# Patient Record
Sex: MALE | Race: WHITE | NOT HISPANIC OR LATINO | Employment: OTHER | URBAN - NONMETROPOLITAN AREA
[De-identification: names, ages, dates, MRNs, and addresses within clinical notes are randomized per-mention and may not be internally consistent; named-entity substitution may affect disease eponyms.]

---

## 2017-06-10 ENCOUNTER — RESULTS ENCOUNTER (OUTPATIENT)
Dept: UROLOGY | Facility: CLINIC | Age: 72
End: 2017-06-10

## 2017-06-10 DIAGNOSIS — R97.20 ELEVATED PROSTATE SPECIFIC ANTIGEN (PSA): ICD-10-CM

## 2017-06-22 ENCOUNTER — OFFICE VISIT (OUTPATIENT)
Dept: UROLOGY | Facility: CLINIC | Age: 72
End: 2017-06-22

## 2017-06-22 VITALS
WEIGHT: 199 LBS | BODY MASS INDEX: 26.95 KG/M2 | DIASTOLIC BLOOD PRESSURE: 94 MMHG | HEIGHT: 72 IN | SYSTOLIC BLOOD PRESSURE: 142 MMHG | TEMPERATURE: 97.3 F

## 2017-06-22 DIAGNOSIS — N13.8 BPH (BENIGN PROSTATIC HYPERTROPHY) WITH URINARY OBSTRUCTION: ICD-10-CM

## 2017-06-22 DIAGNOSIS — N40.1 BPH (BENIGN PROSTATIC HYPERTROPHY) WITH URINARY OBSTRUCTION: ICD-10-CM

## 2017-06-22 DIAGNOSIS — R97.20 ELEVATED PROSTATE SPECIFIC ANTIGEN (PSA): Primary | ICD-10-CM

## 2017-06-22 LAB
BILIRUB BLD-MCNC: NEGATIVE MG/DL
CLARITY, POC: CLEAR
COLOR UR: YELLOW
GLUCOSE UR STRIP-MCNC: NEGATIVE MG/DL
KETONES UR QL: NEGATIVE
LEUKOCYTE EST, POC: NEGATIVE
NITRITE UR-MCNC: NEGATIVE MG/ML
PH UR: 6 [PH] (ref 5–8)
PROT UR STRIP-MCNC: NEGATIVE MG/DL
RBC # UR STRIP: NEGATIVE /UL
SP GR UR: 1.02 (ref 1–1.03)
UROBILINOGEN UR QL: NORMAL

## 2017-06-22 PROCEDURE — 99213 OFFICE O/P EST LOW 20 MIN: CPT | Performed by: UROLOGY

## 2017-06-22 PROCEDURE — 81003 URINALYSIS AUTO W/O SCOPE: CPT | Performed by: UROLOGY

## 2017-06-22 NOTE — PROGRESS NOTES
Subjective    Mr. Dutta is 71 y.o. male    Chief Complaint: Elevated PSA/BPH    History of Present Illness   Elevated PSA  Patient is here with an elevated PSA. The PSA was drawn1 week(s). He does not have a family history of prostate cancer. His AUA Symptom Score is 9 /35. Voiding symptoms include Incomplete emptying, Intermittency, Urgency, Weakened stream, Straining and Nocturia. Denies Frequency, Dysuria and Hematuria. Voiding symptoms began several years . These have been gradual in onset. none  Previous PSA values are : 4.25 (8.5)  No results found for: PSA      Benign Prostatic Hypertrophy  Patient complains of lower urinary tract symptoms. He reports incomplete emptying, nocturia one time a night and straining. He denies frequency. Patient states symptoms are of mild severity. Onset of symptoms was several years ago and was gradual in onset. His AUA Symptom Score is, 9/35.He reports a history of no complicating symptoms. He denies flank pain, gross hematuria, kidney stones and recurrent UTI. Patient has tried Watchful waiting with improvement. Last PSA was 4.25 .      The following portions of the patient's history were reviewed and updated as appropriate: allergies, current medications, past family history, past medical history, past social history, past surgical history and problem list.    Review of Systems   Constitutional: Negative for chills and fever.   Gastrointestinal: Negative for abdominal pain, anal bleeding and blood in stool.   Genitourinary: Negative for flank pain and hematuria.         Current Outpatient Prescriptions:   •  folic acid (FOLVITE) 1 MG tablet, Take 1 mg by mouth Daily., Disp: , Rfl:   •  losartan-hydrochlorothiazide (HYZAAR) 100-25 MG per tablet, Take 1 tablet by mouth Daily., Disp: , Rfl:   •  methylphenidate (RITALIN) 10 MG tablet, Take 10 mg by mouth 2 (Two) Times a Day., Disp: , Rfl:   •  nebivolol (BYSTOLIC) 10 MG tablet, Take 10 mg by mouth Daily., Disp: , Rfl:   •   "simvastatin (ZOCOR) 40 MG tablet, Take 40 mg by mouth Every Night., Disp: , Rfl:   •  sucralfate (CARAFATE) 1 G tablet, Take 1 g by mouth 4 (Four) Times a Day., Disp: , Rfl:     Past Medical History:   Diagnosis Date   • Anxiety    • Hypertension        Past Surgical History:   Procedure Laterality Date   • BACK SURGERY         Social History     Social History   • Marital status:      Spouse name: N/A   • Number of children: N/A   • Years of education: N/A     Social History Main Topics   • Smoking status: Former Smoker   • Smokeless tobacco: Current User     Types: Chew   • Alcohol use Yes      Comment: seldom   • Drug use: None   • Sexual activity: Not Asked     Other Topics Concern   • None     Social History Narrative       Family History   Problem Relation Age of Onset   • No Known Problems Father    • No Known Problems Mother        Objective    /94  Temp 97.3 °F (36.3 °C)  Ht 72\" (182.9 cm)  Wt 199 lb (90.3 kg)  BMI 26.99 kg/m2    Physical Exam   Constitutional: He is oriented to person, place, and time. He appears well-developed and well-nourished.   Pulmonary/Chest: Effort normal.   Abdominal: Soft. He exhibits no distension and no mass. There is no tenderness. There is no rebound and no guarding. No hernia.   Genitourinary: Penis normal. Rectal exam shows no mass, no tenderness and anal tone normal. Enlarged: for the age of the patient. Right testis shows no mass, no swelling and no tenderness. Left testis shows no mass, no swelling and no tenderness. No hypospadias. No discharge found.   Genitourinary Comments:  The urethral meatus normal in position without evidence of stricture. Epididymis without mass or tenderness. Vas Deferens is palpably normal.Anus and perineum without mass or tenderness. The prostate is approximately 40 ml. It is Symmetric, with a Soft consistency. There are no nodules present. . The seminal vesicles are Not palpable due to the size of the prostate.   "   Neurological: He is alert and oriented to person, place, and time.   Vitals reviewed.          Results for orders placed or performed in visit on 06/22/17   POC Urinalysis Dipstick, Automated   Result Value Ref Range    Color Yellow Yellow, Straw, Dark Yellow, Yudith    Clarity, UA Clear Clear    Glucose, UA Negative Negative, 1000 mg/dL (3+) mg/dL    Bilirubin Negative Negative    Ketones, UA Negative Negative    Specific Gravity  1.025 1.005 - 1.030    Blood, UA Negative Negative    pH, Urine 6.0 5.0 - 8.0    Protein, POC Negative Negative mg/dL    Urobilinogen, UA Normal Normal    Leukocytes Negative Negative    Nitrite, UA Negative Negative     Assessment and Plan    Diagnoses and all orders for this visit:    Elevated prostate specific antigen (PSA)  -     POC Urinalysis Dipstick, Automated  -     PSA, Total & Free; Future    BPH (benign prostatic hypertrophy) with urinary obstruction    Other orders  -     SCANNED - LABS        Patient was examined pattern to his PSA been high as 8.5 its venous flows 4.25.  It is 6.8 today with a high percent free greater than 25%.  This is a 9% risk of having a positive biopsy.  I will have him return in 1 year with PSA and percent free.

## 2018-06-22 ENCOUNTER — RESULTS ENCOUNTER (OUTPATIENT)
Dept: UROLOGY | Facility: CLINIC | Age: 73
End: 2018-06-22

## 2018-06-22 DIAGNOSIS — R97.20 ELEVATED PROSTATE SPECIFIC ANTIGEN (PSA): ICD-10-CM

## 2018-09-18 NOTE — PROGRESS NOTES
Subjective    Mr. Dutta is 73 y.o. male    Chief Complaint: Elevated PSA/BPH    History of Present Illness     Elevated PSA  Patient is here with an elevated PSA. The PSA was drawn1 week(s). He does not have a family history of prostate cancer. His AUA Symptom Score is 5 /35. Voiding symptoms include Incomplete emptying, Intermittency, Urgency, Weakened stream, Straining and Nocturia. Denies Frequency, Dysuria and Hematuria. Voiding symptoms began several years . These have been gradual in onset. none  Previous PSA values are : (6.8,4.25,8.5)  Recent 6.1 on 8/21/2018        Benign Prostatic Hypertrophy  Patient complains of lower urinary tract symptoms. He reports incomplete emptying, nocturia one time a night and straining. He denies frequency. Patient states symptoms are of mild severity. Onset of symptoms was several years ago and was gradual in onset. His AUA Symptom Score is, 5/35.He reports a history of no complicating symptoms. He denies flank pain, gross hematuria, kidney stones and recurrent UTI. Patient has tried Watchful waiting with improvement. Last PSA was 6.1 .    The following portions of the patient's history were reviewed and updated as appropriate: allergies, current medications, past family history, past medical history, past social history, past surgical history and problem list.    Review of Systems   Constitutional: Negative for chills and fever.   Gastrointestinal: Negative for abdominal pain, anal bleeding and blood in stool.   Genitourinary: Negative for decreased urine volume, difficulty urinating, discharge, dysuria, enuresis, flank pain, frequency, genital sores, hematuria, penile pain, penile swelling, scrotal swelling, testicular pain and urgency.         Current Outpatient Prescriptions:   •  folic acid (FOLVITE) 1 MG tablet, Take 1 mg by mouth Daily., Disp: , Rfl:   •  losartan-hydrochlorothiazide (HYZAAR) 100-25 MG per tablet, Take 1 tablet by mouth Daily., Disp: , Rfl:   •   "methylphenidate (RITALIN) 10 MG tablet, Take 10 mg by mouth 2 (Two) Times a Day., Disp: , Rfl:   •  nebivolol (BYSTOLIC) 10 MG tablet, Take 10 mg by mouth Daily., Disp: , Rfl:   •  simvastatin (ZOCOR) 40 MG tablet, Take 40 mg by mouth Every Night., Disp: , Rfl:   •  sucralfate (CARAFATE) 1 G tablet, Take 1 g by mouth 4 (Four) Times a Day., Disp: , Rfl:     Past Medical History:   Diagnosis Date   • Anxiety    • Hypertension        Past Surgical History:   Procedure Laterality Date   • BACK SURGERY         Social History     Social History   • Marital status:      Social History Main Topics   • Smoking status: Former Smoker   • Smokeless tobacco: Current User     Types: Chew   • Alcohol use Yes      Comment: seldom   • Drug use: Unknown     Other Topics Concern   • Not on file       Family History   Problem Relation Age of Onset   • No Known Problems Father    • No Known Problems Mother        Objective    Temp 97.7 °F (36.5 °C)   Ht 182.9 cm (72\")   Wt 101 kg (223 lb)   BMI 30.24 kg/m²     Physical Exam   Constitutional: He is oriented to person, place, and time. He appears well-developed and well-nourished.   Pulmonary/Chest: Effort normal.   Abdominal: Soft. He exhibits no distension and no mass. There is no tenderness. There is no rebound and no guarding. No hernia.   Genitourinary: Penis normal. Rectal exam shows no mass, no tenderness and anal tone normal. Enlarged: for the age of the patient. Right testis shows no mass, no swelling and no tenderness. Left testis shows no mass, no swelling and no tenderness. No hypospadias. No discharge found.   Genitourinary Comments:  The urethral meatus normal in position without evidence of stricture. Epididymis without mass or tenderness. Vas Deferens is palpably normal.Anus and perineum without mass or tenderness. The prostate is approximately 40 ml. It is Symmetric, with a Soft consistency. There are no nodules present. . The seminal vesicles are Not palpable " due to the size of the prostate.     Neurological: He is alert and oriented to person, place, and time.   Vitals reviewed.          Results for orders placed or performed in visit on 09/19/18   POC Urinalysis Dipstick, Multipro   Result Value Ref Range    Color Yellow Yellow, Straw, Dark Yellow, Yudith    Clarity, UA Clear Clear    Glucose, UA Negative Negative, 1000 mg/dL (3+) mg/dL    Bilirubin Negative Negative    Ketones, UA Negative Negative    Specific Gravity  1.025 1.005 - 1.030    Blood, UA Negative Negative    pH, Urine 6.0 5.0 - 8.0    Protein, POC Negative Negative mg/dL    Urobilinogen, UA Normal Normal    Nitrite, UA Negative Negative    Leukocytes Negative Negative     Assessment and Plan    Diagnoses and all orders for this visit:    Elevated prostate specific antigen (PSA)  -     POC Urinalysis Dipstick, Multipro  -     PSA, Total & Free; Future    BPH with urinary obstruction         Patient has had a zigzag pattern to his PSA.  His last PSA was 6.1 with a percent free of 33%.  This indicates likely benign disease.  He has had PSAs as high as 8.5 and as low as 4.25.     He is doing well from a urinary standpoint I will have him follow-up in 1 year with PSA and percent free.  At some point we will stop checking his PSAs if they remain stable.

## 2018-09-19 ENCOUNTER — OFFICE VISIT (OUTPATIENT)
Dept: UROLOGY | Facility: CLINIC | Age: 73
End: 2018-09-19

## 2018-09-19 VITALS — TEMPERATURE: 97.7 F | BODY MASS INDEX: 30.2 KG/M2 | WEIGHT: 223 LBS | HEIGHT: 72 IN

## 2018-09-19 DIAGNOSIS — R97.20 ELEVATED PROSTATE SPECIFIC ANTIGEN (PSA): ICD-10-CM

## 2018-09-19 DIAGNOSIS — N40.1 BPH WITH URINARY OBSTRUCTION: ICD-10-CM

## 2018-09-19 DIAGNOSIS — R97.20 ELEVATED PROSTATE SPECIFIC ANTIGEN (PSA): Primary | ICD-10-CM

## 2018-09-19 DIAGNOSIS — N13.8 BPH WITH URINARY OBSTRUCTION: ICD-10-CM

## 2018-09-19 PROCEDURE — 99213 OFFICE O/P EST LOW 20 MIN: CPT | Performed by: UROLOGY

## 2018-09-19 PROCEDURE — 81001 URINALYSIS AUTO W/SCOPE: CPT | Performed by: UROLOGY

## 2018-10-08 DIAGNOSIS — R97.20 ELEVATED PROSTATE SPECIFIC ANTIGEN (PSA): Primary | ICD-10-CM

## 2018-10-13 ENCOUNTER — RESULTS ENCOUNTER (OUTPATIENT)
Dept: UROLOGY | Facility: CLINIC | Age: 73
End: 2018-10-13

## 2018-10-13 DIAGNOSIS — R97.20 ELEVATED PROSTATE SPECIFIC ANTIGEN (PSA): ICD-10-CM

## 2018-10-31 DIAGNOSIS — N40.1 BPH WITH URINARY OBSTRUCTION: ICD-10-CM

## 2018-10-31 DIAGNOSIS — R97.20 ELEVATED PROSTATE SPECIFIC ANTIGEN (PSA): Primary | ICD-10-CM

## 2018-10-31 DIAGNOSIS — N13.8 BPH WITH URINARY OBSTRUCTION: ICD-10-CM

## 2019-08-22 DIAGNOSIS — N40.1 BPH WITH URINARY OBSTRUCTION: ICD-10-CM

## 2019-08-22 DIAGNOSIS — R97.20 ELEVATED PROSTATE SPECIFIC ANTIGEN (PSA): ICD-10-CM

## 2019-08-22 DIAGNOSIS — N13.8 BPH WITH URINARY OBSTRUCTION: ICD-10-CM

## 2019-09-20 NOTE — PROGRESS NOTES
Subjective    Mr. Dutta is 74 y.o. male    Chief Complaint: BPH    History of Present Illness  Elevated PSA  Patient is here with an elevated PSA. The PSA was drawn1 week(s). He does not have a family history of prostate cancer. His AUA Symptom Score is 11 /35. Voiding symptoms include Incomplete emptying, Intermittency, Urgency, Weakened stream, Straining and Nocturia. Denies Frequency, Dysuria and Hematuria. Voiding symptoms began several years . These have been gradual in onset. none  Previous PSA values are : 4.3  (6.8,4.25,8.5)         The following portions of the patient's history were reviewed and updated as appropriate: allergies, current medications, past family history, past medical history, past social history, past surgical history and problem list.    Review of Systems   Constitutional: Negative for chills and fever.   Gastrointestinal: Negative for abdominal pain, anal bleeding and blood in stool.   Genitourinary: Positive for frequency and urgency. Negative for dysuria and hematuria.         Current Outpatient Medications:   •  losartan-hydrochlorothiazide (HYZAAR) 100-25 MG per tablet, Take 1 tablet by mouth Daily., Disp: , Rfl:   •  nebivolol (BYSTOLIC) 10 MG tablet, Take 10 mg by mouth Daily., Disp: , Rfl:   •  rosuvastatin (CRESTOR) 20 MG tablet, Take 20 mg by mouth Daily., Disp: , Rfl:   •  sertraline (ZOLOFT) 100 MG tablet, Take 100 mg by mouth Daily., Disp: , Rfl:   •  folic acid (FOLVITE) 1 MG tablet, Take 1 mg by mouth Daily., Disp: , Rfl:   •  methylphenidate (RITALIN) 10 MG tablet, Take 10 mg by mouth 2 (Two) Times a Day., Disp: , Rfl:   •  simvastatin (ZOCOR) 40 MG tablet, Take 40 mg by mouth Every Night., Disp: , Rfl:   •  sucralfate (CARAFATE) 1 G tablet, Take 1 g by mouth 4 (Four) Times a Day., Disp: , Rfl:     Past Medical History:   Diagnosis Date   • Anxiety    • Hypertension        Past Surgical History:   Procedure Laterality Date   • BACK SURGERY         Social History  "    Socioeconomic History   • Marital status:      Spouse name: Not on file   • Number of children: Not on file   • Years of education: Not on file   • Highest education level: Not on file   Tobacco Use   • Smoking status: Former Smoker   • Smokeless tobacco: Current User     Types: Chew   Substance and Sexual Activity   • Alcohol use: Yes     Comment: seldom       Family History   Problem Relation Age of Onset   • No Known Problems Father    • No Known Problems Mother        Objective    Temp 98.7 °F (37.1 °C)   Ht 182.9 cm (72\")   Wt 104 kg (229 lb)   BMI 31.06 kg/m²     Physical Exam   Constitutional: He is oriented to person, place, and time. He appears well-developed and well-nourished.   Pulmonary/Chest: Effort normal.   Abdominal: Soft. He exhibits no distension and no mass. There is no tenderness. There is no rebound and no guarding. No hernia.   Genitourinary: Penis normal. Rectal exam shows no mass, no tenderness and anal tone normal. Enlarged: for the age of the patient. Right testis shows no mass, no swelling and no tenderness. Left testis shows no mass, no swelling and no tenderness. No hypospadias. No discharge found.   Genitourinary Comments: .Anus and perineum without mass or tenderness. The prostate is approximately 40 ml. It is Symmetric, with a Soft consistency. There are no nodules present. . The seminal vesicles are Not palpable due to the size of the prostate.     Neurological: He is alert and oriented to person, place, and time.   Vitals reviewed.          Results for orders placed or performed in visit on 09/25/19   POC Urinalysis Dipstick, Multipro   Result Value Ref Range    Color Yellow Yellow, Straw, Dark Yellow, Yudith    Clarity, UA Clear Clear    Glucose, UA Negative Negative, 1000 mg/dL (3+) mg/dL    Bilirubin Negative Negative    Ketones, UA Negative Negative    Specific Gravity  1.020 1.005 - 1.030    Blood, UA Negative Negative    pH, Urine 7.0 5.0 - 8.0    Protein, POC " Negative Negative mg/dL    Urobilinogen, UA Normal Normal    Nitrite, UA Negative Negative    Leukocytes Negative Negative     Patient's Body mass index is 31.06 kg/m². BMI is above normal parameters. Recommendations include: educational material.    Assessment and Plan    Diagnoses and all orders for this visit:    BPH with urinary obstruction  -     POC Urinalysis Dipstick, Multipro  -     PSA, Total & Free; Future    Elevated prostate specific antigen (PSA)      Patient has had a zigzag pattern to his PSA.  PSA today is 4.3 with a percent free of 47.4%. He has had PSAs as high as 8.5 and as low as 4.25.      He is doing well from a urinary standpoint I will have him follow-up in 1 year with PSA and percent free.  At some point we will stop checking his PSAs if they remain stable.

## 2019-09-25 ENCOUNTER — OFFICE VISIT (OUTPATIENT)
Dept: UROLOGY | Facility: CLINIC | Age: 74
End: 2019-09-25

## 2019-09-25 VITALS — BODY MASS INDEX: 31.02 KG/M2 | HEIGHT: 72 IN | TEMPERATURE: 98.7 F | WEIGHT: 229 LBS

## 2019-09-25 DIAGNOSIS — N40.1 BPH WITH URINARY OBSTRUCTION: Primary | ICD-10-CM

## 2019-09-25 DIAGNOSIS — N13.8 BPH WITH URINARY OBSTRUCTION: Primary | ICD-10-CM

## 2019-09-25 DIAGNOSIS — R97.20 ELEVATED PROSTATE SPECIFIC ANTIGEN (PSA): ICD-10-CM

## 2019-09-25 LAB
BILIRUB BLD-MCNC: NEGATIVE MG/DL
CLARITY, POC: CLEAR
COLOR UR: YELLOW
GLUCOSE UR STRIP-MCNC: NEGATIVE MG/DL
KETONES UR QL: NEGATIVE
LEUKOCYTE EST, POC: NEGATIVE
NITRITE UR-MCNC: NEGATIVE MG/ML
PH UR: 7 [PH] (ref 5–8)
PROT UR STRIP-MCNC: NEGATIVE MG/DL
RBC # UR STRIP: NEGATIVE /UL
SP GR UR: 1.02 (ref 1–1.03)
UROBILINOGEN UR QL: NORMAL

## 2019-09-25 PROCEDURE — 99213 OFFICE O/P EST LOW 20 MIN: CPT | Performed by: UROLOGY

## 2019-09-25 PROCEDURE — 81001 URINALYSIS AUTO W/SCOPE: CPT | Performed by: UROLOGY

## 2019-09-25 RX ORDER — SERTRALINE HYDROCHLORIDE 100 MG/1
100 TABLET, FILM COATED ORAL DAILY
COMMUNITY

## 2019-09-25 RX ORDER — ROSUVASTATIN CALCIUM 20 MG/1
20 TABLET, COATED ORAL DAILY
COMMUNITY

## 2019-09-25 NOTE — PATIENT INSTRUCTIONS

## 2020-11-04 ENCOUNTER — OFFICE VISIT (OUTPATIENT)
Dept: UROLOGY | Facility: CLINIC | Age: 75
End: 2020-11-04

## 2020-11-04 VITALS — HEIGHT: 72 IN | BODY MASS INDEX: 31.72 KG/M2 | TEMPERATURE: 96.9 F | WEIGHT: 234.2 LBS

## 2020-11-04 DIAGNOSIS — N13.8 BPH WITH URINARY OBSTRUCTION: Primary | ICD-10-CM

## 2020-11-04 DIAGNOSIS — N40.1 BPH WITH URINARY OBSTRUCTION: Primary | ICD-10-CM

## 2020-11-04 DIAGNOSIS — R97.20 ELEVATED PROSTATE SPECIFIC ANTIGEN (PSA): ICD-10-CM

## 2020-11-04 LAB
BILIRUB BLD-MCNC: NEGATIVE MG/DL
CLARITY, POC: CLEAR
COLOR UR: YELLOW
GLUCOSE UR STRIP-MCNC: NEGATIVE MG/DL
KETONES UR QL: NEGATIVE
LEUKOCYTE EST, POC: NEGATIVE
NITRITE UR-MCNC: NEGATIVE MG/ML
PH UR: 5.5 [PH] (ref 5–8)
PROT UR STRIP-MCNC: NEGATIVE MG/DL
RBC # UR STRIP: NEGATIVE /UL
SP GR UR: 1.02 (ref 1–1.03)
UROBILINOGEN UR QL: NORMAL

## 2020-11-04 PROCEDURE — 99213 OFFICE O/P EST LOW 20 MIN: CPT | Performed by: UROLOGY

## 2020-11-04 PROCEDURE — 81003 URINALYSIS AUTO W/O SCOPE: CPT | Performed by: UROLOGY

## 2020-11-04 RX ORDER — LOSARTAN POTASSIUM 100 MG/1
TABLET ORAL
COMMUNITY
Start: 2020-08-16

## 2020-11-04 RX ORDER — LISINOPRIL AND HYDROCHLOROTHIAZIDE 25; 20 MG/1; MG/1
1 TABLET ORAL DAILY
COMMUNITY

## 2021-11-04 NOTE — PROGRESS NOTES
Subjective    Mr. Dutta is 76 y.o. male    Chief Complaint: BPH/Elevated PSA.    History of Present Illness  Elevated PSA  Patient is here with an elevated PSA. The PSA was drawn1 week(s). He does not have a family history of prostate cancer. His AUA Symptom Score is 15 /35. Voiding symptoms include Incomplete emptying, Intermittency, Urgency, Weakened stream, Straining and Nocturia. Denies Frequency, Dysuria and Hematuria. Voiding symptoms began several years . These have been gradual in onset. None    PSA- 6.00 (09/08/2021)    The following portions of the patient's history were reviewed and updated as appropriate: allergies, current medications, past family history, past medical history, past social history, past surgical history and problem list.    Review of Systems   Constitutional: Negative for chills and fever.   Gastrointestinal: Negative for abdominal pain, anal bleeding and blood in stool.   Genitourinary: Positive for urgency. Negative for dysuria, frequency and hematuria.         Current Outpatient Medications:   •  folic acid (FOLVITE) 1 MG tablet, Take 1 mg by mouth Daily., Disp: , Rfl:   •  lisinopril-hydrochlorothiazide (PRINZIDE,ZESTORETIC) 20-25 MG per tablet, Take 1 tablet by mouth Daily., Disp: , Rfl:   •  losartan (COZAAR) 100 MG tablet, , Disp: , Rfl:   •  losartan-hydrochlorothiazide (HYZAAR) 100-25 MG per tablet, Take 1 tablet by mouth Daily., Disp: , Rfl:   •  methylphenidate (RITALIN) 10 MG tablet, Take 10 mg by mouth 2 (Two) Times a Day., Disp: , Rfl:   •  nebivolol (BYSTOLIC) 10 MG tablet, Take 10 mg by mouth Daily., Disp: , Rfl:   •  rosuvastatin (CRESTOR) 20 MG tablet, Take 20 mg by mouth Daily., Disp: , Rfl:   •  sertraline (ZOLOFT) 100 MG tablet, Take 100 mg by mouth Daily., Disp: , Rfl:   •  simvastatin (ZOCOR) 40 MG tablet, Take 40 mg by mouth Every Night., Disp: , Rfl:   •  sucralfate (CARAFATE) 1 G tablet, Take 1 g by mouth 4 (Four) Times a Day., Disp: , Rfl:     Past Medical  "History:   Diagnosis Date   • Anxiety    • Hypertension        Past Surgical History:   Procedure Laterality Date   • BACK SURGERY         Social History     Socioeconomic History   • Marital status:    Tobacco Use   • Smoking status: Former Smoker   • Smokeless tobacco: Current User     Types: Chew   Vaping Use   • Vaping Use: Never used   Substance and Sexual Activity   • Alcohol use: Yes     Comment: seldom   • Drug use: Never   • Sexual activity: Defer       Family History   Problem Relation Age of Onset   • No Known Problems Father    • No Known Problems Mother        Objective    Temp 97.4 °F (36.3 °C) (Temporal)   Ht 182.9 cm (72\")   Wt 106 kg (232 lb 12.8 oz)   BMI 31.57 kg/m²     Physical Exam  Genitourinary:     Comments: 40gm smooth no nodules            Results for orders placed or performed in visit on 11/04/20   POC Urinalysis Dipstick, Multipro    Specimen: Urine   Result Value Ref Range    Color Yellow Yellow, Straw, Dark Yellow, Yudith    Clarity, UA Clear Clear    Glucose, UA Negative Negative, 1000 mg/dL (3+) mg/dL    Bilirubin Negative Negative    Ketones, UA Negative Negative    Specific Gravity  1.020 1.005 - 1.030    Blood, UA Negative Negative    pH, Urine 5.5 5.0 - 8.0    Protein, POC Negative Negative mg/dL    Urobilinogen, UA Normal Normal    Nitrite, UA Negative Negative    Leukocytes Negative Negative     Assessment and Plan    Diagnoses and all orders for this visit:    1. BPH with urinary obstruction (Primary)  -     Cancel: POC Urinalysis Dipstick, Multipro  -     PSA, Total & Free; Future    2. Elevated prostate specific antigen (PSA)  -     PSA, Total & Free; Future    Patient has had a zigzag pattern to his PSA.  PSA today is 6. He has had PSAs as high as 8.5 and as low as 4.25.      He is doing well from a urinary standpoint I will have him follow-up in 1 year with PSA and percent free.        At some point we will stop checking his PSAs if they remain stable.            "

## 2021-11-08 ENCOUNTER — OFFICE VISIT (OUTPATIENT)
Dept: UROLOGY | Facility: CLINIC | Age: 76
End: 2021-11-08

## 2021-11-08 VITALS — WEIGHT: 232.8 LBS | HEIGHT: 72 IN | TEMPERATURE: 97.4 F | BODY MASS INDEX: 31.53 KG/M2

## 2021-11-08 DIAGNOSIS — R97.20 ELEVATED PROSTATE SPECIFIC ANTIGEN (PSA): ICD-10-CM

## 2021-11-08 DIAGNOSIS — N40.1 BPH WITH URINARY OBSTRUCTION: Primary | ICD-10-CM

## 2021-11-08 DIAGNOSIS — N13.8 BPH WITH URINARY OBSTRUCTION: Primary | ICD-10-CM

## 2021-11-08 PROCEDURE — 99213 OFFICE O/P EST LOW 20 MIN: CPT | Performed by: UROLOGY

## 2023-02-21 ENCOUNTER — TELEPHONE (OUTPATIENT)
Dept: UROLOGY | Facility: CLINIC | Age: 78
End: 2023-02-21
Payer: MEDICARE

## 2023-02-21 NOTE — PROGRESS NOTES
Subjective    Mr. Dutta is 77 y.o. male    Chief Complaint: BPH/Elevated PSA    History of Present Illness    Patient is here with an elevated PSA. . He does not have a family history of prostate cancer. His AUA Symptom Score is 13 /35. Voiding symptoms include Incomplete emptying, Intermittency, Urgency, Weakened stream, Straining and Nocturia, Frequency. Voiding symptoms began several years . These have been gradual in onset. No history of previous prostate biopsy.     PSA- 5.09 (02/01/2023)  PSA- 6.00 (09/08/2021)       The following portions of the patient's history were reviewed and updated as appropriate: allergies, current medications, past family history, past medical history, past social history, past surgical history and problem list.    Review of Systems   Constitutional: Negative for chills and fever.   Gastrointestinal: Negative for abdominal pain, anal bleeding and blood in stool.   Genitourinary: Negative for difficulty urinating, dysuria, frequency, hematuria and urgency.         Current Outpatient Medications:   •  hydroCHLOROthiazide (HYDRODIURIL) 25 MG tablet, Take 25 mg by mouth Daily., Disp: , Rfl:   •  losartan (COZAAR) 100 MG tablet, , Disp: , Rfl:   •  nebivolol (BYSTOLIC) 10 MG tablet, Take 10 mg by mouth Daily., Disp: , Rfl:   •  rosuvastatin (CRESTOR) 20 MG tablet, Take 20 mg by mouth Daily., Disp: , Rfl:   •  sertraline (ZOLOFT) 100 MG tablet, Take 100 mg by mouth Daily., Disp: , Rfl:   •  folic acid (FOLVITE) 1 MG tablet, Take 1 mg by mouth Daily., Disp: , Rfl:   •  lisinopril-hydrochlorothiazide (PRINZIDE,ZESTORETIC) 20-25 MG per tablet, Take 1 tablet by mouth Daily., Disp: , Rfl:   •  losartan-hydrochlorothiazide (HYZAAR) 100-25 MG per tablet, Take 1 tablet by mouth Daily., Disp: , Rfl:   •  methylphenidate (RITALIN) 10 MG tablet, Take 10 mg by mouth 2 (Two) Times a Day., Disp: , Rfl:   •  simvastatin (ZOCOR) 40 MG tablet, Take 40 mg by mouth Every Night., Disp: , Rfl:   •  sucralfate  "(CARAFATE) 1 G tablet, Take 1 g by mouth 4 (Four) Times a Day., Disp: , Rfl:     Past Medical History:   Diagnosis Date   • Anxiety    • Hypertension        Past Surgical History:   Procedure Laterality Date   • BACK SURGERY         Social History     Socioeconomic History   • Marital status:    Tobacco Use   • Smoking status: Former   • Smokeless tobacco: Current     Types: Chew   Vaping Use   • Vaping Use: Never used   Substance and Sexual Activity   • Alcohol use: Yes     Comment: seldom   • Drug use: Never   • Sexual activity: Defer       Family History   Problem Relation Age of Onset   • No Known Problems Father    • No Known Problems Mother        Objective    Temp 97.6 °F (36.4 °C)   Ht 167.6 cm (66\")   Wt 106 kg (234 lb 3.2 oz)   BMI 37.80 kg/m²     Physical Exam        Results for orders placed or performed in visit on 02/22/23   POC Urinalysis Dipstick, Multipro    Specimen: Urine   Result Value Ref Range    Color Yellow Yellow, Straw, Dark Yellow, Yudith    Clarity, UA Clear Clear    Glucose, UA Negative Negative mg/dL    Bilirubin Negative Negative    Ketones, UA Negative Negative    Specific Gravity  1.025 1.005 - 1.030    Blood, UA Negative Negative    pH, Urine 5.5 5.0 - 8.0    Protein, POC Negative Negative mg/dL    Urobilinogen, UA 0.2 E.U./dL Normal, 0.2 E.U./dL    Nitrite, UA Negative Negative    Leukocytes Negative Negative       IPSS Questionnaire (AUA-7):  Incomplete emptying  Over the past month, how often have you had a sensation of not emptying your bladder completely after you finish?: Less than 1 time in 5 (02/22/23 0839)  Frequency  Over the past month, how often have you had to urinate again less than two hours after you finishing urinating ?: Less than half the time (02/22/23 0839)  Intermittency  Over the past month, how often have you found you stopped and started again several time when you urinated ?: About half the time (02/22/23 0839)  Urgency  Over the last month, how " difficult  have you found it to postpone urination ?: Less than half the time (02/22/23 0839)  Weak Stream  Over the past month, how often have you had a weak urinary stream ?: About half the time (02/22/23 0839)  Straining  Over the past month, how often have you had to push or strain to begin urination ?: Less than 1 time 5 (02/22/23 0839)  Nocturia  Over the past month, how many times did you most typically get up to urinate from the time you went to bed until the time you got up in the morning ?: Less than 1 time in 5 (02/22/23 0839)  Quality of life due to urinary symptoms  If you were to spend the rest of your life with your urinary condition the way it is now, how would feel about that?: Mostly Satisfied (02/22/23 0839)    Scores  Total IPSS Score: 13 (02/22/23 0839)  Total Score = Symtomatic Level: moderately symptomatic: 8-19 (02/22/23 0839)        Assessment and Plan    Diagnoses and all orders for this visit:    1. Elevated prostate specific antigen (PSA) (Primary)  -     POC Urinalysis Dipstick, Multipro  -     PSA Total+% Free (Serial); Future    2. BPH with urinary obstruction  -     POC Urinalysis Dipstick, Multipro            I discussed the following important considerations with the patient. Current American [employer] Association prostate cancer screening recommendations include informed decision making on the pros and cons of prostate cancer between the ages of 50-> 69 years old.   They do not recommend routine PSA screening in men older than 70 or any man with less than a 10 to 15-year life expectancy.  The panel also noted that some men older than 70 years are in excellent health and may benefit from prostate cancer screening.  However the likelihood of over diagnosis increases as men age, and is particularly high for older men with low risk disease.  Men over 70 who wished to be screened should do so after understanding that the benefit decreases while the harm increases with age, although there  is evidence that men with high risk disease in this age group may benefit from early diagnosis and treatment over a decade or less.    I discussed the pros and cons of biopsy with him as follows:  -Screening does not lower the risk of having prostate cancer; it increases the chance you will find out to have it.  -PSA testing can detect early stage cancers that a digital rectal exam would miss.  -A normal PSA level of 4 or below does not guarantee a patient is cancer free; e.g. about 15% of men with a PSA below 4, a biopsy will reveal prostate cancer  -High PSA level may prompt a patient to seek treatment, resulting in possible urinary and sexual side effects.  -Conditions other than cancer-BPH and prostatitis, for example-can elevate PSA    -Screening may lead to a biopsy which may:  *provide a false negative result (approximately 10% of patients diagnosed with cancer have a history of negative biopsy)  *diagnose a prostate cancer that will never lead to symptoms or death even without treatment so some patients have unnecessary treatment  *side effects that can be severe such as sepsis from infection, bleeding, and even transient sexual dysfunction      We also discussed other societal recommendations for cancer screening such as:  -American Cancer Society recommends informed decision on asymptomatic men with greater than 10-year life expectancy.  -The American Academy of Family Practice recommends informed decision making with men between 55 and 69 years old but there is agreement that men age 70 or older should not be screened for prostate cancer.  -The National Cancer Care Network agrees that PSA testing should only be offered to men with  10 or more year life expectancy.  This panel supported screening in men until age 75.  Continued screening beyond 75 should be only with caution and very healthy patients with little to no comorbidity to detect a small number of aggressive cancers that pose a significant risk  if left untreated.     After thorough discussion we have decided to discontinue PSA screening.  He will follow-up with me on a as needed basis.

## 2023-02-21 NOTE — TELEPHONE ENCOUNTER
Called Patient to remind them to get PSA prior to appointment.  Spoke with Patient who voiced his understanding and states he will bring this with him.  If patient calls back it is ok for the [facility] to tell the pt the message.

## 2023-02-22 ENCOUNTER — OFFICE VISIT (OUTPATIENT)
Dept: UROLOGY | Facility: CLINIC | Age: 78
End: 2023-02-22
Payer: MEDICARE

## 2023-02-22 VITALS — TEMPERATURE: 97.6 F | BODY MASS INDEX: 37.64 KG/M2 | WEIGHT: 234.2 LBS | HEIGHT: 66 IN

## 2023-02-22 DIAGNOSIS — N13.8 BPH WITH URINARY OBSTRUCTION: ICD-10-CM

## 2023-02-22 DIAGNOSIS — R97.20 ELEVATED PROSTATE SPECIFIC ANTIGEN (PSA): Primary | ICD-10-CM

## 2023-02-22 DIAGNOSIS — N40.1 BPH WITH URINARY OBSTRUCTION: ICD-10-CM

## 2023-02-22 PROCEDURE — 81001 URINALYSIS AUTO W/SCOPE: CPT | Performed by: UROLOGY

## 2023-02-22 PROCEDURE — 99213 OFFICE O/P EST LOW 20 MIN: CPT | Performed by: UROLOGY

## 2023-02-22 RX ORDER — HYDROCHLOROTHIAZIDE 25 MG/1
25 TABLET ORAL DAILY
COMMUNITY